# Patient Record
(demographics unavailable — no encounter records)

---

## 2024-10-17 NOTE — HISTORY OF PRESENT ILLNESS
[de-identified] : 69-year-old HIV-positive patient presents his office for ongoing management of bilateral knee pain.  She has had pain in her knees intermittently to a lesser degree for a while but about 4 months ago while getting up from a tub injured her right knee.  Has had persistent right knee pain since that time.  As this is occurred her left knee pain has gotten worse.  He tried nonsteroidal anti-inflammatory medications prescribed out of the walk-in clinic which she did not find particularly helpful.  At the time she was being evaluated by the hand service for some thumb pain.  Rheumatoid factor blood test were drawn which came back abnormal.  She has recently been prescribed but has not yet filled medications for rheumatoid arthritis.  Regarding her knee pain, she is the worst pain when she has to get up from a low chair.  Has some pain with prolonged standing or ambulation.  Patient does not have discomfort at rest.  Does not walk with a cane or assistive device.  She did have some pain initially while walking and resting.  Patient has been evaluate by rheumatology who aspirated and took fluid out of her knee.  Past medical history: HIV positive Hypertension Hypothyroid Rheumatoid arthritis-recently diagnosed  Medications: Descovy Trivay Synthroid  Allergies: NKDA  Social: No cigarette use, no EtOH, lives with a friend, single, retired corporate finance

## 2024-10-17 NOTE — IMAGING
[de-identified] : Pleasant middle-age woman sits comfortably my office no distress.  Physical examination: Bilateral knees: No evidence of effusion with minimal synovial thickening.  No joint line tenderness.  No varus valgus instability.  Patellofemoral femoral tracking is normal.  No J sign or patella tilt.  Abnormal patellofemoral grind test particularly on the right side.  Negative Lachman test.  Apley's and Mazin's tests are essentially normal.  Does have some mild discomfort on the right knee with a varus load.  No geniculate lymph nodes or masses.  Calf soft no cords.  Knee motion full is (0-140 degrees) without any evidence of pain.  Gait normal with no antalgia or limp.  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

## 2024-10-17 NOTE — ASSESSMENT
[FreeTextEntry1] : 69-year-old woman with recently diagnosed rheumatoid arthritis.  Referred to our offices because of MRI findings of a degenerative meniscal tear.  Physical examination does not suggest that her MRI documented meniscal tear is the cause of her discomfort.  She really has no symptoms of instability and provocative testing does not really elicit much pain where the meniscal tear occurs.  What does cause pain is the chondromalacia patella I, which is basically early arthritis underneath her kneecap.  She has had a positive response to ibuprofen.  I would continue with the physical therapy under the auspices of her rheumatologist.  I would also defer to the rheumatologist about any other medications.  Unless she is having symptoms of giving way or locking or her knee pain does not respond to the physical therapy and the anti-inflammatory medications is really not any orthopedic intervention required at this time.  She is welcome to come back at any time.  We are happy to see her.

## 2024-12-10 NOTE — CONSULT LETTER
[Dear  ___] : Dear  [unfilled], [Courtesy Letter:] : I had the pleasure of seeing your patient, [unfilled], in my office today. [Please see my note below.] : Please see my note below. [FreeTextEntry2] : Dr. okko Khan

## 2024-12-10 NOTE — ASSESSMENT
[FreeTextEntry1] : 68 y/o female with h/o HIV on HAART, HTN, hyperlipidemia, history of carotid stenosis. Denies any h/o CVA or TIA. Presents for follow up.I performed a carotid duplex which was medically necessary to evaluate for carotid stenosis. It showed 50-69% right ICA stenosis and <50% left ICA stenosis. I recommend the patient follows up in 6 months' time or sooner if any new symptoms develop. I recommend that she follow up with cardiology for evaluation of her HTN.    I, Dr. Kwasi Walker, personally performed the evaluation and management (E/M) services for this established patient who presents today with (a) new problem(s)/exacerbation of (an) existing condition(s). That E/M includes conducting the clinically appropriate interval history &/or exam, assessing all new/exacerbated conditions, and establishing a new plan of care. Today, my PEYTON, Mona Muhammad PA-C, was here to observe my evaluation and management service for this new problem/exacerbated condition and follow the plan of care established by me going forward.

## 2024-12-10 NOTE — DATA REVIEWED
[FreeTextEntry1] : I performed a carotid duplex which was medically necessary to evaluate for carotid stenosis. It showed 50-69% right ICA stenosis and <50% left ICA stenosis.

## 2024-12-10 NOTE — HISTORY OF PRESENT ILLNESS
[FreeTextEntry1] : 70 y/o female with h/o HIV on HAART, HTN, hyperlipidemia, history of carotid stenosis. Denies any h/o CVA or TIA. Presents for follow up. The patient complains of a thumping sound in her ear that correlates with HTN

## 2025-04-30 NOTE — HISTORY OF PRESENT ILLNESS
[FreeTextEntry1] : Pt with pelvic floor dysfunction here for urogynecologic evaluation. She describes:   Chief PFD: urinary urgency and nocturia  HIV positive, on HAART  9/11/2024: proteus R amp R cefazolin R macrobid R bactrim 9/3/2024: e coli R amp  Pelvic organ prolapse: s/p uterine artery embolization, +bulge, for years, worsening, perineal splinting for Type I Stress urinary incontinence: denies Overactive bladder syndrome: urgency and frequency, +nocturia, no incontinence, for years, no glaucoma Voiding dysfunction: no Incomplete bladder emptying, no hesitancy  Lower urinary tract/vaginal symptoms: as above UTIs per year (only symptom was odor), no hematuria, no dysuria, no bladder pain  Fecal incontinence: denies Defecatory dysfunction: sausage  Sexual dysfunction: not active secondary to boyfriend just had prostate surgery Pelvic pain: denies Vaginal dryness denies  Her pelvic floor symptoms are significantly bothersome and negatively impacting her quality of life.

## 2025-04-30 NOTE — DISCUSSION/SUMMARY
[FreeTextEntry1] :  Urinary urgency- The pathophysiology of the above condition was discussed with the patient. The patient was given information and education on pelvic floor muscle exercises/rehabilitation, avoidance of dietary bladder irritants, and other strategies to improve bladder control, such as scheduled voiding. She was counseled regarding further management strategies for overactive bladder and urge incontinence including pelvic floor physical therapy, medications or surgical management. The patient voiced understanding and agrees with diet changes and medical managment. The risks and benefits of gemtesa was reviewed. We will follow up on her urine tests.  Uterovaginal prolapse complete- The patient was counseled regarding the possible natural progression of prolapse and the clinical consequences of worsening prolapse. The stage and the location of the prolapse was reviewed with the patient. She was counseled regarding the management strategies including observation, pessary placement and surgery (Patient wants to preserve the length of her vagina so therefore we discussed robotic hysterectomy/BS0/sacrocolpopexy). THE PATIENT WAS ADVISED THAT PROLAPSE DOES NOT CAUSE URINARY URGENCY AND FREQUENCY AND TREATMENT FOR PROLAPSE CAN WORSEN THE URGENCY AND FREQUENCY. The patient voiced understanding and agrees with observation for now since she just started a Non profit company and can't have the postoperative restrictions at this time.

## 2025-04-30 NOTE — REASON FOR VISIT
[TextEntry] : Reason for visit: New Pt  Voids per day:   4-5 Voids per night:   4-5 Urge incontinence Yes Stress incontinence: No Constipation: No Fecal incontinence: No Vaginal bulge: yes

## 2025-04-30 NOTE — COUNSELING
[FreeTextEntry1] : We will notify you of the urine results if they are abnormal.  For 4-5 days, cut one item from the list out of your diet.   After 4-5 days, if it makes a difference, you have to decide if it is worth keeping it out of your diet to help with the urinary issues.   If it does not make a difference, you should add it back into your diet and remove another item for another 4-5 days.  Avoid liquid intake 2 hours before bedtime. It is recommended that you take sips of water to take your medications before bedtime.   Please start taking the gemtesa (bladder medication) nightly 2 hours prior to bedtime for night time urinary frequency  We don't expect any changes for at least 2 weeks, we see the full effect at 6 weeks. There are refills at the pharmacy.  Please call my office if you have any issues with the cost or side effects of the medication.  Schedule a 6 week med check with either my PA, Luz or my NP, Tammi (urgency). PVR check

## 2025-04-30 NOTE — PHYSICAL EXAM
[MA] : MA [FreeTextEntry2] : Sridevi [FreeTextEntry1] : Void: 15 cc PVR: 20 cc Urethra was prepped in sterile fashion and then a sterile non- indwelling catheter (14F) was used by me to drain the bladder. The patient tolerated the procedure well. Indication: urinary urgency  GH:  3.5   pB: 4 TVL: 5 C: +4 D: -2.5 Aa: +3 Ba: +4 Ap: +3 Bp: +4   Well healed incision:  Pfannenstiel normal perineal sensation normal perineal reflexes cough stress test - atrophy + prolapse + bilateral levator ani spasm,+  tenderness - urethral tenderness - bladder tenderness - cervical tenderness - uterine tenderness mild 1/5 Kegel

## 2025-04-30 NOTE — END OF VISIT
[TextEntry] : 60m E&M, >50% spent in direct face to face counseling/coordination of care complete uterovaginal prolapse, urinary urgency. This excludes cath. All questions answered. Patient reassured.